# Patient Record
Sex: FEMALE | Race: WHITE | URBAN - METROPOLITAN AREA
[De-identification: names, ages, dates, MRNs, and addresses within clinical notes are randomized per-mention and may not be internally consistent; named-entity substitution may affect disease eponyms.]

---

## 2019-05-23 ENCOUNTER — OFFICE VISIT (OUTPATIENT)
Dept: DERMATOLOGY | Facility: CLINIC | Age: 59
End: 2019-05-23

## 2019-05-23 DIAGNOSIS — R23.8 FACIAL AGING: Primary | ICD-10-CM

## 2019-05-23 PROCEDURE — 96999 UNLISTED SPEC DERM SVC/PX: CPT | Performed by: DERMATOLOGY

## 2019-05-23 NOTE — NURSING NOTE
June Ewing's goals for this visit include:   Chief Complaint   Patient presents with     Procedure     Resident training        She requests these members of her care team be copied on today's visit information: yes     PCP: No Ref-Primary, Physician    Referring Provider:  No referring provider defined for this encounter.    There were no vitals taken for this visit.    Do you need any medication refills at today's visit? No     Amorrandrea Klein CMA

## 2019-05-23 NOTE — LETTER
5/23/2019         RE: June Ewing  [address] [address]        Dear Colleague,    Thank you for referring your patient, June Ewing, to the [facility]. Please see a copy of my visit note below.    Soft Tissue Augmentation Procedure Note: Cosmetic    Procedure Date: 5/23/2019    Attending Staff: Marc Woody DO    Resident: Darwin Ewing MD    Diagnosis: Facial rhytides and loss of central facial volume    Location: Upper lip  Product: Restylane silk  Amount: 0.1 mL  Lot #: [id number]  Exp Date: 11/30/2020    Location: Lateral oral commissures  Product: Restylane silk  Amount: 0.2 mL  Lot #: [id number]  Exp Date: 11/30/2020    Location: Tear troughs  Product: Restylane silk  Amount: 0.4mL  Lot #: [id number]  Exp Date: 11/30/2020    Description of Operation/Procedure:   The nature and purpose of the procedure, associated risks, possible consequences and complications, and alternative methods of treatment were explained in detail including but not limited to bruising, pain, redness,lumps/bumps, granuloma formation, scar blindness, stroke, ulceration, ischemia, under correction, over correction, swelling, possible need for multiple treatments, infection, granuloma, pain, dyspigmentation, numbness, weakness or tingling were explained to the patient. We discussed that multiple treatments may be required.Discussion of FDA on-label and off-label use was completed and disclosure for any sites treated off-label versus on-label was provided to patient. The patient verbalized understanding. Photo consent and signed informed consent were obtained.Time-out was performed and patient denied history of severe allergy to bees.      The facial areas were cleansed and injections were performed.  The patient tolerated the procedure well and there were no complications. Ice was provided post-procedure. The patient was provided after care instructions and will follow-up in 1 week PRN.      Dr. Woody staffed the patient.     Staff:  Resident/Staff     Darwin Ewing MD      Botulinum Injection Procedure Note:  Cosmetic    ATTENDING STAFF SURGEON: Dr. Woody    RESIDENT SURGEON:  Darwin Ewing MD      ANESTHESIA:   Topical LMX    PREOPERATIVE DIAGNOSIS:   Crow's feet, Furrowing and Rhytides    LOCATION: [place], Crow's feet, upper lip    LOT NO: [id number]    EXP DATE: 02/2021    OPERATION/PROCEDURE:   Intralesional botulinum toxin injection     Dilution with preserved sterile normal saline in a 50 Unit Botox    Total units of botulinum toxin: 44     POSTOPERATIVE DIAGNOSIS:   SAME     PREPARATION:   Alcohol swab    DESCRIPTION OF OPERATION/PROCEDURE:   The nature and purpose of the procedure, associated risks including but not limited to bruising, headache or discomfort at the site(s), numbness, muscle twitching, brow or eyelid droop, headache, double vision, not enough effect or too much effect, difficulty whistling or drinking from a straw, loss of muscle tone, or infection. Possible consequences and complications, and alternative methods of treatment were explained in detail. The patient declined a personal or family history of neuromuscular disease prior to the procedure. The patient is not pregnant or breast feeding. A signed informed operative consent was obtained.    The patient was taken to the operative suite and properly positioned. The area to be treated was defined and confirmed by the patient and physician. The area for Botox injection was marked.    Cosmetic procedure: A total of 44 Units were injected into sites as above. The patient tolerated the procedure well and there were no complications noted. Patient was given wound care instructions and will follow-up in approximately PRN in 2 weeks.     Dr. Woody was immediately available for the entire surgery and was physicially present for the key portions of the procedure.    Clinical Follow-Up: PRN    Staff Involved:  Resident/Staff      Darwin Ewing MD  Dermatology Resident, PGY3        Staff Physician Comments:   I saw and evaluated the patient with the resident (Dr. Darwin Ewing) and I agree with the assessment and plan and above description of the procedures. I was present for the entire procedure and examination.  The resident performed the cosmetic services as part of training.   Resident training sample product was used.  No charge.     Marc Woody DO    Department of Dermatology  [facility]: Phone: [phone], Fax:[phone]  [facility]: Phone: [phone], Fax: [phone]    Again, thank you for allowing me to participate in the care of your patient.        Sincerely,        Marc Woody MD

## 2019-05-24 NOTE — PROGRESS NOTES
Soft Tissue Augmentation Procedure Note: Cosmetic    Procedure Date: 5/23/2019    Attending Staff: Marc Woody DO    Resident: Darwin Ewing MD    Diagnosis: Facial rhytides and loss of central facial volume    Location: Upper lip  Product: Restylane silk  Amount: 0.1 mL  Lot #: [id number]  Exp Date: 11/30/2020    Location: Lateral oral commissures  Product: Restylane silk  Amount: 0.2 mL  Lot #: [id number]  Exp Date: 11/30/2020    Location: Tear troughs  Product: Restylane silk  Amount: 0.4mL  Lot #: [id number]  Exp Date: 11/30/2020    Description of Operation/Procedure:   The nature and purpose of the procedure, associated risks, possible consequences and complications, and alternative methods of treatment were explained in detail including but not limited to bruising, pain, redness,lumps/bumps, granuloma formation, scar blindness, stroke, ulceration, ischemia, under correction, over correction, swelling, possible need for multiple treatments, infection, granuloma, pain, dyspigmentation, numbness, weakness or tingling were explained to the patient. We discussed that multiple treatments may be required.Discussion of FDA on-label and off-label use was completed and disclosure for any sites treated off-label versus on-label was provided to patient. The patient verbalized understanding. Photo consent and signed informed consent were obtained.Time-out was performed and patient denied history of severe allergy to bees.      The facial areas were cleansed and injections were performed.  The patient tolerated the procedure well and there were no complications. Ice was provided post-procedure. The patient was provided after care instructions and will follow-up in 1 week PRN.     Dr. Woody staffed the patient.     Staff:  Resident/Staff     Darwin Ewing MD      Botulinum Injection Procedure Note:  Cosmetic    ATTENDING STAFF SURGEON: Dr. Woody    RESIDENT SURGEON:  Darwin Ewing MD      ANESTHESIA:   Topical LMX    PREOPERATIVE  DIAGNOSIS:   Crow's feet, Furrowing and Rhytides    LOCATION: [place], Crow's feet, upper lip    LOT NO: [id number]    EXP DATE: 02/2021    OPERATION/PROCEDURE:   Intralesional botulinum toxin injection     Dilution with preserved sterile normal saline in a 50 Unit Botox    Total units of botulinum toxin: 44     POSTOPERATIVE DIAGNOSIS:   SAME     PREPARATION:   Alcohol swab    DESCRIPTION OF OPERATION/PROCEDURE:   The nature and purpose of the procedure, associated risks including but not limited to bruising, headache or discomfort at the site(s), numbness, muscle twitching, brow or eyelid droop, headache, double vision, not enough effect or too much effect, difficulty whistling or drinking from a straw, loss of muscle tone, or infection. Possible consequences and complications, and alternative methods of treatment were explained in detail. The patient declined a personal or family history of neuromuscular disease prior to the procedure. The patient is not pregnant or breast feeding. A signed informed operative consent was obtained.    The patient was taken to the operative suite and properly positioned. The area to be treated was defined and confirmed by the patient and physician. The area for Botox injection was marked.    Cosmetic procedure: A total of 44 Units were injected into sites as above. The patient tolerated the procedure well and there were no complications noted. Patient was given wound care instructions and will follow-up in approximately PRN in 2 weeks.     Dr. Woody was immediately available for the entire surgery and was physicially present for the key portions of the procedure.    Clinical Follow-Up: PRN    Staff Involved:  Resident/Staff     Darwin Ewing MD  Dermatology Resident, PGY3        Staff Physician Comments:   I saw and evaluated the patient with the resident (Dr. Darwin Ewing) and I agree with the assessment and plan and above description of the procedures. I was present for the entire  procedure and examination.  The resident performed the cosmetic services as part of training.   Resident training sample product was used.  No charge.     Marc Woody DO    Department of Dermatology  [facility]: Phone: [phone], Fax:[phone]  [facility]: Phone: [phone], Fax: [phone]